# Patient Record
Sex: FEMALE | Race: WHITE | NOT HISPANIC OR LATINO | URBAN - METROPOLITAN AREA
[De-identification: names, ages, dates, MRNs, and addresses within clinical notes are randomized per-mention and may not be internally consistent; named-entity substitution may affect disease eponyms.]

---

## 2021-11-04 ENCOUNTER — SKIN CHECK (OUTPATIENT)
Dept: URBAN - METROPOLITAN AREA CLINIC 94 | Facility: CLINIC | Age: 46
Setting detail: DERMATOLOGY
End: 2021-11-04

## 2021-11-04 DIAGNOSIS — C44.311 BASAL CELL CARCINOMA OF SKIN OF NOSE: ICD-10-CM

## 2021-11-04 PROCEDURE — 99213 OFFICE O/P EST LOW 20 MIN: CPT

## 2022-10-20 ENCOUNTER — APPOINTMENT (OUTPATIENT)
Dept: URBAN - METROPOLITAN AREA CLINIC 209 | Age: 47
Setting detail: DERMATOLOGY
End: 2022-10-20

## 2022-10-20 DIAGNOSIS — Z41.9 ENCOUNTER FOR PROCEDURE FOR PURPOSES OTHER THAN REMEDYING HEALTH STATE, UNSPECIFIED: ICD-10-CM

## 2022-10-20 PROCEDURE — OTHER SCITON BBL: OTHER

## 2022-10-20 ASSESSMENT — LOCATION SIMPLE DESCRIPTION DERM
LOCATION SIMPLE: RIGHT CHEEK
LOCATION SIMPLE: CHIN
LOCATION SIMPLE: LEFT FOREHEAD
LOCATION SIMPLE: RIGHT FOREHEAD
LOCATION SIMPLE: LEFT CHEEK

## 2022-10-20 ASSESSMENT — LOCATION DETAILED DESCRIPTION DERM
LOCATION DETAILED: LEFT INFERIOR CENTRAL MALAR CHEEK
LOCATION DETAILED: LEFT CHIN
LOCATION DETAILED: RIGHT FOREHEAD
LOCATION DETAILED: RIGHT CENTRAL MALAR CHEEK
LOCATION DETAILED: LEFT INFERIOR FOREHEAD

## 2022-10-20 ASSESSMENT — LOCATION ZONE DERM: LOCATION ZONE: FACE

## 2022-10-20 NOTE — PROCEDURE: SCITON BBL
Cooling (In C): 15
Pulse Duration Units: milliseconds
Fluence (J/Cm2): 25
Cooling ?: Yes
Treatment Number: 1
Passes: 2
Spot Size: Finesse Adapter Size: 15 x 15 mm square
Repetition Rate (Hz): 10
Detail Level: Zone
Pulse Duration: 20
Anesthesia Volume In Cc: 0
Consent: Written consent obtained, risks reviewed including but not limited to crusting, scabbing, blistering, scarring, darker or lighter pigmentary change, bruising, and/or incomplete response.
Preprocedure Text: The treatment areas were thoroughly cleaned. Any exposed at risk hair that was not to be treated was covered in white paper tape. Clear ultrasound gel was applied to the treatment area. The area was treated with no immediate stacking of pulses.
Price (Use Numbers Only, No Special Characters Or $): 400.00
Post-Care Instructions: I reviewed with the patient in detail post-care instructions. Patient should stay away from the sun and wear sun protection until treated areas are fully healed.
Post Procedure Text: mild erythema.  Applied elta md tinted spf
Hide Repetition Rate?: No

## 2022-12-02 ENCOUNTER — APPOINTMENT (OUTPATIENT)
Dept: URBAN - METROPOLITAN AREA CLINIC 209 | Age: 47
Setting detail: DERMATOLOGY
End: 2022-12-02

## 2022-12-02 DIAGNOSIS — L71.8 OTHER ROSACEA: ICD-10-CM

## 2022-12-02 DIAGNOSIS — D22 MELANOCYTIC NEVI: ICD-10-CM

## 2022-12-02 DIAGNOSIS — D18.0 HEMANGIOMA: ICD-10-CM

## 2022-12-02 DIAGNOSIS — L57.8 OTHER SKIN CHANGES DUE TO CHRONIC EXPOSURE TO NONIONIZING RADIATION: ICD-10-CM

## 2022-12-02 DIAGNOSIS — L82.1 OTHER SEBORRHEIC KERATOSIS: ICD-10-CM

## 2022-12-02 PROBLEM — D22.5 MELANOCYTIC NEVI OF TRUNK: Status: ACTIVE | Noted: 2022-12-02

## 2022-12-02 PROBLEM — D18.01 HEMANGIOMA OF SKIN AND SUBCUTANEOUS TISSUE: Status: ACTIVE | Noted: 2022-12-02

## 2022-12-02 PROCEDURE — 99213 OFFICE O/P EST LOW 20 MIN: CPT

## 2022-12-02 PROCEDURE — OTHER COUNSELING: OTHER

## 2022-12-02 PROCEDURE — OTHER INVENTORY: OTHER

## 2022-12-02 PROCEDURE — OTHER MIPS QUALITY: OTHER

## 2022-12-02 PROCEDURE — OTHER PRESCRIPTION MEDICATION MANAGEMENT: OTHER

## 2022-12-02 ASSESSMENT — LOCATION DETAILED DESCRIPTION DERM
LOCATION DETAILED: SUPERIOR THORACIC SPINE
LOCATION DETAILED: SUPERIOR MID FOREHEAD
LOCATION DETAILED: INFERIOR THORACIC SPINE
LOCATION DETAILED: SUPERIOR LUMBAR SPINE

## 2022-12-02 ASSESSMENT — LOCATION SIMPLE DESCRIPTION DERM
LOCATION SIMPLE: LOWER BACK
LOCATION SIMPLE: SUPERIOR FOREHEAD
LOCATION SIMPLE: UPPER BACK

## 2022-12-02 ASSESSMENT — LOCATION ZONE DERM
LOCATION ZONE: FACE
LOCATION ZONE: TRUNK

## 2022-12-02 NOTE — PROCEDURE: COUNSELING
Sunscreen Recommendations: Use a sun block with zinc or titanium as primary ingredient EVERY day to face and other sun sensitive areas. A physical sun block, containing zinc and titanium is best as it reflects sun off your skin.  Pure zinc and/ or titanium sun blocks are also excellent for patients with allergies or sensitivities to sunscreen.  Sun blocks should not be used to increase the time spent in sunlight.  \\nUse a sunscreen with zinc or titanium oxide EVERY day.  We recommend Elta MD and ISDIN products.  ISDIN Ageless or Actinica are zinc based but also have an antioxidant and DNA repair enzyme in their formulation which protects your skin on several levels to minimize DNA damage to your skin cells.\\nWear a wide brimmed hat to protect the face when outdoors.  Special protective clothing and hats are available through several companies: [employer]  - www.[employer].com or [employer] - www.[employer].com. Sunscreen Recommendations: Use a sun block with zinc or titanium as primary ingredient EVERY day to face and other sun sensitive areas. A physical sun block, containing zinc and titanium is best as it reflects sun off your skin.  Pure zinc and/ or titanium sun blocks are also excellent for patients with allergies or sensitivities to sunscreen.  Sun blocks should not be used to increase the time spent in sunlight.  \\nUse a sunscreen with zinc or titanium oxide EVERY day.  We recommend Elta MD and ISDIN products.  ISDIN Ageless or Actinica are zinc based but also have an antioxidant and DNA repair enzyme in their formulation which protects your skin on several levels to minimize DNA damage to your skin cells.\\nWear a wide brimmed hat to protect the face when outdoors.  Special protective clothing and hats are available through several companies: [employer]  - www.[employer].com or [employer] - www.[employer].com.

## 2022-12-02 NOTE — PROCEDURE: PRESCRIPTION MEDICATION MANAGEMENT
Detail Level: Simple
Continue Regimen: DSc ivermectin apply thin layer to face daily
Render In Strict Bullet Format?: Yes

## 2022-12-02 NOTE — PROCEDURE: MIPS QUALITY
Quality 110: Preventive Care And Screening: Influenza Immunization: Influenza Immunization Administered during Influenza season
Quality 226: Preventive Care And Screening: Tobacco Use: Screening And Cessation Intervention: Tobacco Screening not Performed for Unknown Reasons
Detail Level: Detailed

## 2023-12-07 ENCOUNTER — APPOINTMENT (OUTPATIENT)
Dept: URBAN - METROPOLITAN AREA CLINIC 209 | Age: 48
Setting detail: DERMATOLOGY
End: 2023-12-07

## 2023-12-07 DIAGNOSIS — D22 MELANOCYTIC NEVI: ICD-10-CM

## 2023-12-07 DIAGNOSIS — L65.0 TELOGEN EFFLUVIUM: ICD-10-CM

## 2023-12-07 DIAGNOSIS — L71.8 OTHER ROSACEA: ICD-10-CM

## 2023-12-07 DIAGNOSIS — D18.0 HEMANGIOMA: ICD-10-CM

## 2023-12-07 DIAGNOSIS — L82.1 OTHER SEBORRHEIC KERATOSIS: ICD-10-CM

## 2023-12-07 DIAGNOSIS — L57.8 OTHER SKIN CHANGES DUE TO CHRONIC EXPOSURE TO NONIONIZING RADIATION: ICD-10-CM

## 2023-12-07 DIAGNOSIS — Q82.5 CONGENITAL NON-NEOPLASTIC NEVUS: ICD-10-CM

## 2023-12-07 PROBLEM — D22.5 MELANOCYTIC NEVI OF TRUNK: Status: ACTIVE | Noted: 2023-12-07

## 2023-12-07 PROBLEM — D18.01 HEMANGIOMA OF SKIN AND SUBCUTANEOUS TISSUE: Status: ACTIVE | Noted: 2023-12-07

## 2023-12-07 PROCEDURE — 99213 OFFICE O/P EST LOW 20 MIN: CPT

## 2023-12-07 PROCEDURE — OTHER PRESCRIPTION MEDICATION MANAGEMENT: OTHER

## 2023-12-07 PROCEDURE — OTHER MIPS QUALITY: OTHER

## 2023-12-07 PROCEDURE — OTHER COUNSELING: OTHER

## 2023-12-07 PROCEDURE — OTHER INVENTORY: OTHER

## 2023-12-07 PROCEDURE — OTHER ADDITIONAL NOTES: OTHER

## 2023-12-07 ASSESSMENT — LOCATION SIMPLE DESCRIPTION DERM
LOCATION SIMPLE: LEFT FOREHEAD
LOCATION SIMPLE: LOWER BACK
LOCATION SIMPLE: SUPERIOR FOREHEAD
LOCATION SIMPLE: RIGHT FOREHEAD
LOCATION SIMPLE: SCALP
LOCATION SIMPLE: UPPER BACK

## 2023-12-07 ASSESSMENT — LOCATION DETAILED DESCRIPTION DERM
LOCATION DETAILED: SUPERIOR MID FOREHEAD
LOCATION DETAILED: RIGHT SUPERIOR FOREHEAD
LOCATION DETAILED: SUPERIOR LUMBAR SPINE
LOCATION DETAILED: SUPERIOR THORACIC SPINE
LOCATION DETAILED: INFERIOR THORACIC SPINE
LOCATION DETAILED: RIGHT CENTRAL POSTAURICULAR SKIN
LOCATION DETAILED: LEFT SUPERIOR MEDIAL FOREHEAD

## 2023-12-07 ASSESSMENT — LOCATION ZONE DERM
LOCATION ZONE: SCALP
LOCATION ZONE: FACE
LOCATION ZONE: TRUNK

## 2023-12-07 NOTE — PROCEDURE: ADDITIONAL NOTES
Render Risk Assessment In Note?: no
Additional Notes: No evidence of psoriasis on skin exam today.
Detail Level: Detailed

## 2023-12-07 NOTE — PROCEDURE: PRESCRIPTION MEDICATION MANAGEMENT
Detail Level: Simple
Continue Regimen: DSC Ivermectin-apply thin layer to face daily
Render In Strict Bullet Format?: Yes

## 2023-12-07 NOTE — PROCEDURE: COUNSELING
Detail Level: Detailed
Detail Level: Generalized
Ipl Recommendations: Telangiectasias are permanently dilated blood vessels that remain visible on the skin surface.  They are caused by sun damaging the elastin in the blood vessel walls.  Laser is the only to remove the damaged blood vessels.  We offer BBL for diffuse blood vessels and redness and the ND:Yag for stubborn individual vessels.  Several treatments are usually necessary.\\nTopical treatments have not been found to be helpful for removal.\\nGreen or Yellow tinted makeup tend to minimize the appearance of the redness.
Laser Recommendations: Sebaceous glands are often enlarged in rosacea.  This occurs when the sun damages elastin and collagen in the skin.  These tiny glands become clogged and eventually enlarge into visible white bumps.  Removal is temporary with various modalities including electrocautery and laser.
Sunscreen Recommendations: Use a sun block with zinc or titanium as primary ingredient EVERY day to face and other sun sensitive areas. A physical sun block, containing zinc and titanium is best as it reflects sun off your skin.  Pure zinc and/ or titanium sun blocks are also excellent for patients with allergies or sensitivities to sunscreen.  Sun blocks should not be used to increase the time spent in sunlight.  \\nUse a sunscreen with zinc or titanium oxide EVERY day.  We recommend Elta MD and ISDIN products.  ISDIN Ageless or Actinica are zinc based but also have an antioxidant and DNA repair enzyme in their formulation which protects your skin on several levels to minimize DNA damage to your skin cells.\\nWear a wide brimmed hat to protect the face when outdoors.  Special protective clothing and hats are available through several companies: [employer]  - www.[email or web address] or [employer] - www.[employer].com.
Cleanser Recommendations: Bathe daily to allow water to absorb into the skin. Use mild cleanser.  Try Avene Hydrance or Clean-AC, CeraVe, or Cetaphil Restoraderm \\nShower or bathe in lukewarm water. Never HOT.\\nPat dry, leaving some water drops on skin.
Topical Retinoids Recommendations: Topical retinoids or retinols can help repair sun damage that plays a role in rosacea.  It should be started once your rosacea is under good control.  Start twice weekly and increase slowly to daily as tolerated.  Night time application is best as the sun can deactivate the active component.
Moisturizer Recommendations: Apply Avene Hydrance or Clean AC, CeraVe, or Cetaphil Restoraderm to damp skin.  For best results, apply within 3 minutes of finishing bath or shower.\\nReapply moisturizer after each water exposure including baths, showers, pool, etc.
Detail Level: Zone

## 2025-01-23 ENCOUNTER — APPOINTMENT (OUTPATIENT)
Dept: URBAN - METROPOLITAN AREA CLINIC 209 | Age: 50
Setting detail: DERMATOLOGY
End: 2025-01-26

## 2025-01-23 DIAGNOSIS — D49.2 NEOPLASM OF UNSPECIFIED BEHAVIOR OF BONE, SOFT TISSUE, AND SKIN: ICD-10-CM

## 2025-01-23 PROCEDURE — OTHER PHOTO-DOCUMENTATION: OTHER

## 2025-01-23 PROCEDURE — OTHER COUNSELING: OTHER

## 2025-01-23 PROCEDURE — OTHER MIPS QUALITY: OTHER

## 2025-01-23 PROCEDURE — 99212 OFFICE O/P EST SF 10 MIN: CPT

## 2025-01-23 PROCEDURE — OTHER OBSERVATION: OTHER

## 2025-01-23 ASSESSMENT — LOCATION DETAILED DESCRIPTION DERM: LOCATION DETAILED: RIGHT MEDIAL KNEE

## 2025-01-23 ASSESSMENT — LOCATION SIMPLE DESCRIPTION DERM: LOCATION SIMPLE: RIGHT KNEE

## 2025-01-23 ASSESSMENT — LOCATION ZONE DERM: LOCATION ZONE: LEG

## 2025-04-23 ENCOUNTER — APPOINTMENT (OUTPATIENT)
Dept: URBAN - METROPOLITAN AREA CLINIC 209 | Age: 50
Setting detail: DERMATOLOGY
End: 2025-04-23

## 2025-04-23 DIAGNOSIS — L82.1 OTHER SEBORRHEIC KERATOSIS: ICD-10-CM

## 2025-04-23 DIAGNOSIS — L60.3 NAIL DYSTROPHY: ICD-10-CM

## 2025-04-23 DIAGNOSIS — D22 MELANOCYTIC NEVI: ICD-10-CM

## 2025-04-23 DIAGNOSIS — D18.0 HEMANGIOMA: ICD-10-CM

## 2025-04-23 DIAGNOSIS — L57.8 OTHER SKIN CHANGES DUE TO CHRONIC EXPOSURE TO NONIONIZING RADIATION: ICD-10-CM

## 2025-04-23 PROBLEM — D22.62 MELANOCYTIC NEVI OF LEFT UPPER LIMB, INCLUDING SHOULDER: Status: ACTIVE | Noted: 2025-04-23

## 2025-04-23 PROBLEM — D22.5 MELANOCYTIC NEVI OF TRUNK: Status: ACTIVE | Noted: 2025-04-23

## 2025-04-23 PROBLEM — L60.9 NAIL DISORDER, UNSPECIFIED: Status: ACTIVE | Noted: 2025-04-23

## 2025-04-23 PROBLEM — D48.5 NEOPLASM OF UNCERTAIN BEHAVIOR OF SKIN: Status: ACTIVE | Noted: 2025-04-23

## 2025-04-23 PROBLEM — D23.72 OTHER BENIGN NEOPLASM OF SKIN OF LEFT LOWER LIMB, INCLUDING HIP: Status: ACTIVE | Noted: 2025-04-23

## 2025-04-23 PROBLEM — D22.61 MELANOCYTIC NEVI OF RIGHT UPPER LIMB, INCLUDING SHOULDER: Status: ACTIVE | Noted: 2025-04-23

## 2025-04-23 PROBLEM — D22.72 MELANOCYTIC NEVI OF LEFT LOWER LIMB, INCLUDING HIP: Status: ACTIVE | Noted: 2025-04-23

## 2025-04-23 PROBLEM — D22.71 MELANOCYTIC NEVI OF RIGHT LOWER LIMB, INCLUDING HIP: Status: ACTIVE | Noted: 2025-04-23

## 2025-04-23 PROBLEM — D18.01 HEMANGIOMA OF SKIN AND SUBCUTANEOUS TISSUE: Status: ACTIVE | Noted: 2025-04-23

## 2025-04-23 PROCEDURE — 99213 OFFICE O/P EST LOW 20 MIN: CPT

## 2025-04-23 PROCEDURE — OTHER COUNSELING: OTHER

## 2025-04-23 PROCEDURE — OTHER MIPS QUALITY: OTHER

## 2025-04-23 PROCEDURE — OTHER OBSERVATION: OTHER

## 2025-04-23 PROCEDURE — OTHER NAIL CLIPPING FOR PAS: OTHER

## 2025-04-23 PROCEDURE — OTHER OTHER: OTHER

## 2025-04-23 ASSESSMENT — LOCATION DETAILED DESCRIPTION DERM
LOCATION DETAILED: LEFT INFERIOR MEDIAL FOREHEAD
LOCATION DETAILED: EPIGASTRIC SKIN
LOCATION DETAILED: PERIUMBILICAL SKIN
LOCATION DETAILED: INFERIOR THORACIC SPINE
LOCATION DETAILED: LEFT PROXIMAL PRETIBIAL REGION
LOCATION DETAILED: RIGHT PROXIMAL DORSAL FOREARM
LOCATION DETAILED: RIGHT VENTRAL PROXIMAL FOREARM
LOCATION DETAILED: LEFT ANTERIOR DISTAL UPPER ARM
LOCATION DETAILED: LEFT BUTTOCK
LOCATION DETAILED: RIGHT PROXIMAL PRETIBIAL REGION
LOCATION DETAILED: UPPER STERNUM
LOCATION DETAILED: RIGHT ANTERIOR DISTAL UPPER ARM
LOCATION DETAILED: LEFT PROXIMAL DORSAL FOREARM
LOCATION DETAILED: LEFT DISTAL PRETIBIAL REGION
LOCATION DETAILED: RIGHT ANTERIOR DISTAL THIGH
LOCATION DETAILED: LEFT VENTRAL PROXIMAL FOREARM
LOCATION DETAILED: RIGHT GREAT TOENAIL

## 2025-04-23 ASSESSMENT — LOCATION SIMPLE DESCRIPTION DERM
LOCATION SIMPLE: LEFT BUTTOCK
LOCATION SIMPLE: RIGHT FOREARM
LOCATION SIMPLE: CHEST
LOCATION SIMPLE: ABDOMEN
LOCATION SIMPLE: UPPER BACK
LOCATION SIMPLE: LEFT FOREHEAD
LOCATION SIMPLE: RIGHT UPPER ARM
LOCATION SIMPLE: RIGHT PRETIBIAL REGION
LOCATION SIMPLE: RIGHT THIGH
LOCATION SIMPLE: LEFT UPPER ARM
LOCATION SIMPLE: LEFT FOREARM
LOCATION SIMPLE: LEFT PRETIBIAL REGION
LOCATION SIMPLE: RIGHT GREAT TOE

## 2025-04-23 ASSESSMENT — LOCATION ZONE DERM
LOCATION ZONE: FACE
LOCATION ZONE: TOENAIL
LOCATION ZONE: ARM
LOCATION ZONE: LEG
LOCATION ZONE: TRUNK

## 2025-04-23 NOTE — PROCEDURE: OTHER
Detail Level: Zone
Render Risk Assessment In Note?: no
Note Text (......Xxx Chief Complaint.): This diagnosis correlates with the
Other (Free Text): .\\nPt will plan for excision. She understands risk of scarring after procedure.
